# Patient Record
Sex: MALE | Race: OTHER | NOT HISPANIC OR LATINO | URBAN - METROPOLITAN AREA
[De-identification: names, ages, dates, MRNs, and addresses within clinical notes are randomized per-mention and may not be internally consistent; named-entity substitution may affect disease eponyms.]

---

## 2019-03-25 ENCOUNTER — OUTPATIENT (OUTPATIENT)
Dept: OUTPATIENT SERVICES | Facility: HOSPITAL | Age: 54
LOS: 1 days | End: 2019-03-25

## 2021-07-13 ENCOUNTER — EMERGENCY (EMERGENCY)
Facility: HOSPITAL | Age: 56
LOS: 1 days | Discharge: HOME | End: 2021-07-13
Attending: EMERGENCY MEDICINE
Payer: COMMERCIAL

## 2021-07-13 VITALS
SYSTOLIC BLOOD PRESSURE: 102 MMHG | OXYGEN SATURATION: 98 % | HEART RATE: 74 BPM | HEIGHT: 68 IN | TEMPERATURE: 99 F | WEIGHT: 182.98 LBS | DIASTOLIC BLOOD PRESSURE: 62 MMHG | RESPIRATION RATE: 20 BRPM

## 2021-07-13 PROCEDURE — 29105 APPLICATION LONG ARM SPLINT: CPT | Mod: RT

## 2021-07-13 PROCEDURE — 99284 EMERGENCY DEPT VISIT MOD MDM: CPT | Mod: 25

## 2021-07-13 PROCEDURE — 73080 X-RAY EXAM OF ELBOW: CPT

## 2021-07-13 PROCEDURE — 99284 EMERGENCY DEPT VISIT MOD MDM: CPT

## 2021-07-13 PROCEDURE — 73080 X-RAY EXAM OF ELBOW: CPT | Mod: 26,RT

## 2021-07-13 RX ORDER — IBUPROFEN 200 MG
600 TABLET ORAL ONCE
Refills: 0 | Status: COMPLETED | OUTPATIENT
Start: 2021-07-13 | End: 2021-07-13

## 2021-07-13 RX ADMIN — Medication 600 MILLIGRAM(S): at 13:48

## 2021-07-13 NOTE — ED PROVIDER NOTE - ATTENDING CONTRIBUTION TO CARE
Patient with elbow injury.  XR with olecranon fracture.  ortho bedside evaluated and splinted.  will f/u.  Non toxic.  Well appearing. no other injury.  Uneventful ED observation period. Discussed signs and symptoms and reasons for return with good teachback. Discussed results and outcome of testing with the patient.  Patient advised to please follow up with their primary care doctor within the next 24 hours and return to the Emergency Department for worsening symptoms or any other concerns.  Patient advised that their doctor may call  to follow up on the specific results of the tests performed today in the emergency department.

## 2021-07-13 NOTE — ED PROVIDER NOTE - PATIENT PORTAL LINK FT
You can access the FollowMyHealth Patient Portal offered by [facility] by registering at the following website: [email or web address]. By joining [employer]’s FollowMyHealth portal, you will also be able to view your health information using other applications (apps) compatible with our system.

## 2021-07-13 NOTE — ED ADULT TRIAGE NOTE - CHIEF COMPLAINT QUOTE
Pt arrives to ED  s/p hitting his R elbow on the car door. Significant swelling noted to the elbow Ice pack applied

## 2021-07-13 NOTE — ED PROVIDER NOTE - CARE PROVIDER_API CALL
Giacomo Marie)  Orthopaedic Surgery  [address] [place] [address]  Phone: [phone]  Fax: [phone]  Follow Up Time:

## 2021-07-13 NOTE — CONSULT NOTE ADULT - SUBJECTIVE AND OBJECTIVE BOX
Patient is a 55y Male presenting to the emergency department with a chief complaint of right elbow pain after hitting it up against a car door earlier today. Patient is RHD, denies numbness/tingling and denies shoulder and wrist pain. Patient has no prior injuries to right elbow. He is a .    REVIEW OF SYSTEMS    General:	denies fever, chills    Skin/Breast: denies rashes  	  Respiratory and Thorax: denies SOB  	  Cardiovascular:	denies CP    Gastrointestinal:	denies abdominal pain    Genitourinary:	denies incontinence    Musculoskeletal:	 + right elbow pain    Neurological:	deniesparesthesias      PAST MEDICAL & SURGICAL HISTORY:  No pertinent past medical history    Allergies: No Known Allergies    Medications: see med rec    FAMILY HISTORY:  : non-contributory    Social History: lives at home alone  ETOH: +  Smoking: +  denies illicit drug use    Ambulation: independent    PHYSICAL EXAM:    Vital Signs Last 24 Hrs  T(C): 37.4 (13 Jul 2021 13:04), Max: 37.4 (13 Jul 2021 13:04)  T(F): 99.3 (13 Jul 2021 13:04), Max: 99.3 (13 Jul 2021 13:04)  HR: 74 (13 Jul 2021 13:04) (74 - 74)  BP: 102/62 (13 Jul 2021 13:04) (102/62 - 102/62)  RR: 20 (13 Jul 2021 13:04) (20 - 20)  SpO2: 98% (13 Jul 2021 13:04) (98% - 98%)    Appearance: Alert, responsive, in no acute distress.  Right elbow: Skin intact, no open wounds, no ecchymosis, + swelling posterior elbow with mild tenderness to palpation. Able to palpate mobile bone posterior elbow at olecranon. Proximal forearm otherwise grossly  AIN/PIN/intrinsics intact  SILT Rad/med/uln distrib. Rad pulse +2  Compartments soft, NT  Wrist grossly NT    Imaging Studies: < from: Xray Elbow AP + Lateral + Oblique, Right (07.13.21 @ 14:01) >  IMPRESSION:  Olecranon fracture with 2 cm gap    < end of copied text >      A/P:  Pt is a  55y Male with right elbow displaced olecranon fracture    PLAN:   ·	NWB right UE  ·	Well padded Posterior splint applied  ·	Pain control  ·	F/u with Dr. Marie outpatient 1 week. Swelling needs to improve before surgical intervention  ·	D/w Dr. Joyce
No

## 2021-07-13 NOTE — ED PROVIDER NOTE - NSFOLLOWUPINSTRUCTIONS_ED_ALL_ED_FT

## 2021-07-13 NOTE — ED PROVIDER NOTE - PHYSICAL EXAMINATION
right elbow: + large effusion noted, + FROM, no bony TTP, radial pulse 2+ , sensation intact distally , skin intact, no erythema

## 2021-07-13 NOTE — ED PROVIDER NOTE - PROGRESS NOTE DETAILS
PT evaluated by ortho and will d/c with ortho follow up. Pt advised of importance of follow up with ortho as he will require surgery.

## 2021-08-02 PROBLEM — Z00.00 ENCOUNTER FOR PREVENTIVE HEALTH EXAMINATION: Status: ACTIVE | Noted: 2021-08-02

## 2021-08-04 ENCOUNTER — APPOINTMENT (OUTPATIENT)
Dept: ORTHOPEDIC SURGERY | Facility: CLINIC | Age: 56
End: 2021-08-04
Payer: COMMERCIAL

## 2021-08-04 VITALS
BODY MASS INDEX: 27.4 KG/M2 | HEIGHT: 69 IN | SYSTOLIC BLOOD PRESSURE: 116 MMHG | WEIGHT: 185 LBS | DIASTOLIC BLOOD PRESSURE: 79 MMHG | HEART RATE: 89 BPM

## 2021-08-04 DIAGNOSIS — F17.200 NICOTINE DEPENDENCE, UNSPECIFIED, UNCOMPLICATED: ICD-10-CM

## 2021-08-04 DIAGNOSIS — Z56.0 UNEMPLOYMENT, UNSPECIFIED: ICD-10-CM

## 2021-08-04 DIAGNOSIS — Z78.9 OTHER SPECIFIED HEALTH STATUS: ICD-10-CM

## 2021-08-04 PROCEDURE — 99204 OFFICE O/P NEW MOD 45 MIN: CPT

## 2021-08-04 PROCEDURE — 99072 ADDL SUPL MATRL&STAF TM PHE: CPT

## 2021-08-04 SDOH — ECONOMIC STABILITY - INCOME SECURITY: UNEMPLOYMENT, UNSPECIFIED: Z56.0

## 2021-08-05 ENCOUNTER — OUTPATIENT (OUTPATIENT)
Dept: OUTPATIENT SERVICES | Facility: HOSPITAL | Age: 56
LOS: 1 days | End: 2021-08-05
Payer: COMMERCIAL

## 2021-08-05 ENCOUNTER — TRANSCRIPTION ENCOUNTER (OUTPATIENT)
Age: 56
End: 2021-08-05

## 2021-08-05 VITALS
WEIGHT: 181.66 LBS | HEIGHT: 69 IN | DIASTOLIC BLOOD PRESSURE: 82 MMHG | SYSTOLIC BLOOD PRESSURE: 132 MMHG | HEART RATE: 74 BPM | RESPIRATION RATE: 18 BRPM | TEMPERATURE: 98 F

## 2021-08-05 DIAGNOSIS — Z98.890 OTHER SPECIFIED POSTPROCEDURAL STATES: Chronic | ICD-10-CM

## 2021-08-05 DIAGNOSIS — H74.92 UNSPECIFIED DISORDER OF LEFT MIDDLE EAR AND MASTOID: Chronic | ICD-10-CM

## 2021-08-05 DIAGNOSIS — Z01.818 ENCOUNTER FOR OTHER PREPROCEDURAL EXAMINATION: ICD-10-CM

## 2021-08-05 DIAGNOSIS — S52.023A DISPLACED FRACTURE OF OLECRANON PROCESS WITHOUT INTRAARTICULAR EXTENSION OF UNSPECIFIED ULNA, INITIAL ENCOUNTER FOR CLOSED FRACTURE: ICD-10-CM

## 2021-08-05 DIAGNOSIS — Z29.9 ENCOUNTER FOR PROPHYLACTIC MEASURES, UNSPECIFIED: ICD-10-CM

## 2021-08-05 DIAGNOSIS — Z71.89 OTHER SPECIFIED COUNSELING: ICD-10-CM

## 2021-08-05 LAB
A1C WITH ESTIMATED AVERAGE GLUCOSE RESULT: 6.5 % — HIGH (ref 4–5.6)
ALBUMIN SERPL ELPH-MCNC: 4.5 G/DL — SIGNIFICANT CHANGE UP (ref 3.3–5.2)
ALP SERPL-CCNC: 177 U/L — HIGH (ref 40–120)
ALT FLD-CCNC: 21 U/L — SIGNIFICANT CHANGE UP
ANION GAP SERPL CALC-SCNC: 14 MMOL/L — SIGNIFICANT CHANGE UP (ref 5–17)
AST SERPL-CCNC: 18 U/L — SIGNIFICANT CHANGE UP
BASOPHILS # BLD AUTO: 0.02 K/UL — SIGNIFICANT CHANGE UP (ref 0–0.2)
BASOPHILS NFR BLD AUTO: 0.3 % — SIGNIFICANT CHANGE UP (ref 0–2)
BILIRUB SERPL-MCNC: 0.7 MG/DL — SIGNIFICANT CHANGE UP (ref 0.4–2)
BLD GP AB SCN SERPL QL: SIGNIFICANT CHANGE UP
BUN SERPL-MCNC: 18.3 MG/DL — SIGNIFICANT CHANGE UP (ref 8–20)
CALCIUM SERPL-MCNC: 9.6 MG/DL — SIGNIFICANT CHANGE UP (ref 8.6–10.2)
CHLORIDE SERPL-SCNC: 99 MMOL/L — SIGNIFICANT CHANGE UP (ref 98–107)
CO2 SERPL-SCNC: 28 MMOL/L — SIGNIFICANT CHANGE UP (ref 22–29)
CREAT SERPL-MCNC: 0.84 MG/DL — SIGNIFICANT CHANGE UP (ref 0.5–1.3)
EOSINOPHIL # BLD AUTO: 0.22 K/UL — SIGNIFICANT CHANGE UP (ref 0–0.5)
EOSINOPHIL NFR BLD AUTO: 3 % — SIGNIFICANT CHANGE UP (ref 0–6)
ESTIMATED AVERAGE GLUCOSE: 140 MG/DL — HIGH (ref 68–114)
GLUCOSE SERPL-MCNC: 105 MG/DL — HIGH (ref 70–99)
HCT VFR BLD CALC: 46.3 % — SIGNIFICANT CHANGE UP (ref 39–50)
HGB BLD-MCNC: 14.5 G/DL — SIGNIFICANT CHANGE UP (ref 13–17)
IMM GRANULOCYTES NFR BLD AUTO: 0.3 % — SIGNIFICANT CHANGE UP (ref 0–1.5)
LYMPHOCYTES # BLD AUTO: 1.83 K/UL — SIGNIFICANT CHANGE UP (ref 1–3.3)
LYMPHOCYTES # BLD AUTO: 25.1 % — SIGNIFICANT CHANGE UP (ref 13–44)
MCHC RBC-ENTMCNC: 31.1 PG — SIGNIFICANT CHANGE UP (ref 27–34)
MCHC RBC-ENTMCNC: 31.3 GM/DL — LOW (ref 32–36)
MCV RBC AUTO: 99.4 FL — SIGNIFICANT CHANGE UP (ref 80–100)
MONOCYTES # BLD AUTO: 0.65 K/UL — SIGNIFICANT CHANGE UP (ref 0–0.9)
MONOCYTES NFR BLD AUTO: 8.9 % — SIGNIFICANT CHANGE UP (ref 2–14)
NEUTROPHILS # BLD AUTO: 4.54 K/UL — SIGNIFICANT CHANGE UP (ref 1.8–7.4)
NEUTROPHILS NFR BLD AUTO: 62.4 % — SIGNIFICANT CHANGE UP (ref 43–77)
PLATELET # BLD AUTO: 234 K/UL — SIGNIFICANT CHANGE UP (ref 150–400)
POTASSIUM SERPL-MCNC: 4.2 MMOL/L — SIGNIFICANT CHANGE UP (ref 3.5–5.3)
POTASSIUM SERPL-SCNC: 4.2 MMOL/L — SIGNIFICANT CHANGE UP (ref 3.5–5.3)
PROT SERPL-MCNC: 8.1 G/DL — SIGNIFICANT CHANGE UP (ref 6.6–8.7)
RBC # BLD: 4.66 M/UL — SIGNIFICANT CHANGE UP (ref 4.2–5.8)
RBC # FLD: 12.3 % — SIGNIFICANT CHANGE UP (ref 10.3–14.5)
SARS-COV-2 RNA SPEC QL NAA+PROBE: SIGNIFICANT CHANGE UP
SODIUM SERPL-SCNC: 141 MMOL/L — SIGNIFICANT CHANGE UP (ref 135–145)
WBC # BLD: 7.28 K/UL — SIGNIFICANT CHANGE UP (ref 3.8–10.5)
WBC # FLD AUTO: 7.28 K/UL — SIGNIFICANT CHANGE UP (ref 3.8–10.5)

## 2021-08-05 PROCEDURE — 71046 X-RAY EXAM CHEST 2 VIEWS: CPT | Mod: 26

## 2021-08-05 PROCEDURE — 93010 ELECTROCARDIOGRAM REPORT: CPT
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
## 2021-08-05 NOTE — H&P PST ADULT - ADMIT DATE
He is not really a candidate for pneumonia vaccine but if he wants 1 that is fine with me   05-Aug-2021

## 2021-08-05 NOTE — H&P PST ADULT - EKG AND INTERPRETATION
EKG reviewed personally Rate =       Bpm   finding    official reading EKG reviewed personally Rate =  64     Bpm   finding  NSR pending   official reading

## 2021-08-05 NOTE — H&P PST ADULT - NSICDXPROBLEM_GEN_ALL_CORE_FT
PROBLEM DIAGNOSES  Problem: Displaced fracture of olecranon process without intraarticular extension of unspecified ulna, initial encounter for closed fracture  Assessment and Plan: pt ia havinga open reduction internal fixation of the right olecranon fracture with Dr Vázquez on 8/6/21    Problem: Need for prophylactic measure  Assessment and Plan: caprini score is 6 at VTE risk SCD's ordered surgical connor to assess the need for pharm  proph     Problem: Educated about COVID-19 virus infection  Assessment and Plan: pt educated on covid testing as well asprevention of covid . Patient has vaccine card in chart
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
## 2021-08-05 NOTE — ASU PATIENT PROFILE, ADULT - TEACHING/LEARNING CULTURAL CONSIDERATIONS
"The patient location is: home  The chief complaint leading to consultation is: Patient requiring therapy for language impairment  Visit type: Virtual visit with synchronous audio and video  Total time spent with patient: 45 minutes  Each patient to whom he or she provides medical services by telemedicine is:  (1) informed of the relationship between the physician and patient and the respective role of any other health care provider with respect to management of the patient; and (2) notified that he or she may decline to receive medical services by telemedicine and may withdraw from such care at any time.    Notes:       Outpatient Pediatric Speech Therapy Daily Note    Date: 4/15/2020    Patient Name: Mp Euceda  MRN: [id number]  Therapy Diagnosis:   Encounter Diagnosis   Name Primary?    Autism spectrum disorder, requiring support, with accompanying language impairment Yes      Physician: Marlys Mon MD   Physician Orders: eval and treat   Medical Diagnosis: ASD  Age: 8  y.o. 3  m.o.    Visit # / Visits Authorized:4/15    Date of Evaluation:   Plan of Care Expiration Date: 12/31/20  Authorization Date: 2/5/20    Time In: 4:00 PM  Time Out: 5:00 PM  Total Billable Time: 60 min     Precautions: standard    Subjective:   Mp Euceda was seen today for speech therapy to address the above. The patient was seen via virtual visit in order to permit the patient to "shelter in place" and to reduce the risk of exposure to COVID-19.    He was compliant to home exercise program.   Response to previous treatment: good   Both parents brought Mp to therapy today.  Pain: Mp was unable to rate pain on a numeric scale, but no pain behaviors were noted in today's session.  Objective:   UNTIMED  Procedure Min.   Speech- Language- Voice Therapy    45   Total Untimed Units: 1  Charges Billed/# of units: 1  Short-term objectives:  Mp will:    1)RJ will complete moderately complex reading tasks " and answer accompanying questions with 90% accuracy (goal met)  2) RJ will answer multi-part questions in entirety with 80% accuracy for punctuation and grammar.  2) RJ will find and highlight evidence in a passage to support his answer with 90% accuracy.  3) RJ will make inferences from reading passages with 90% accuracy when provided with mod cues.  4) Identify and utilize the accurate preposition in, at, or on, during written activities and conversational speech with 80% accuracy when provided with mod cues, across 3 consecutive sessions: Previous dataRJ completed written preposition tasks with 80% accuracy today. (progress maintained)    Goals met:  1. Answer wh- questions with 90% accuracy when provided with min cues across 3 consecutive sessions.Mp answered wh- questions today with 90% accuracy. (Goal met)  2. Demonstrate accurate use of past, present and future tense during conversational speech with 90% accuracy when provided with min cues across 3 consecutive sessions. (Goal met)  3. Demonstrate turn-taking with others during conversational speech with 90% accuracy when provided with in cues across 3 consecutive sessions.  (Goal met)    STO 1:  2/12/20: RJ read the passage and answered questions with 90% accuracy (goal met on this date; add goal to target skill at higher level for next session)  1/29/20- RJ read the passage and answered quesitons for comprehension with 90% accuracy.  1/22/20- RJ read the passage (x) and answered questions for comprehension with 90% accuracy    STO 2:  2/26/20- RJ answered both parts of a 2-part question on 0/4 opportunities today. He was provided with mod cues for attention to task.  1/29/30- RJ answered both parts of a 2- part question on 4/9 opportunities today. He increased accuracy when cued for awareness.  1/22/20- RJ answered both parts to a 2-part question on 3/7 opportunities today. When provided with mod cues and reminders he increased to 5/7    STO  none "3:  4/15/20- RJ completed inferencing activity, using 4 written clues to draw inferences with 90% accuracy.   4/8/20- Today, RJ complete inferencing activity with 90% accuracy.    4/1/20- RJ completed inferencing activity, using 4 written clues to draw inferences with 70% accuracy. He was able to differentiate between "known information" and "inferred information" with 80% accuracy and showed increased ability to make an inference when reading about familiar situations.  3/25/20-Today RJ completed inferencing activity, using 4 written clues to draw inferences with 70% accuracy. He was able to differentiate between "known information" and "inferred information" with 80% accuracy and showed increased ability to make an inference when reading about familiar situations.  3/18/20- Today RJ completed inferencing activity, using 4 written clues to draw inferences with 65% accuracy. He was able to differentiate between "known information" and "inferred information" with 80% accuracy and showed increased ability to make an inference when reading about familiar situations.  3/4/20 RJ made inferences about how what would happen if the passage continued with 65% accuracy today  2/26/20- RJ made inferences about how what would happen if the passage continued with 60% accuracy today. He then inferred why characters reacted the way they did in a story with 0% accuracy. Rather, he listed their direct actions but was unable to piece together why they reacted the way that they did.  1/22/20- RJ made inferences about how characters were feeling based on discriptions in the passage with 85% accuracy. He exhibited difficulty making inferences of what could happen next in a story sequence based on what he read.    Long-term goals:  Mp will exhibit:  1. Age appropriate receptive language skills.  2. Age appropriate expressive language skills.  3. Age appropriate pragmatic language skills.  Patient Education/Response:     Written Home " Exercises Provided: yes.  Strategies / Exercises were reviewed and Mp was able to demonstrate them prior to the end of the session.  Mp demonstrated good  understanding of the education provided.     See EMR under Patient Instructions for exercises provided prior visit  Assessment:   Mp is progressing toward his goals.   Current goals remain appropriate.  Goals will be added and re-assessed as needed.      Pt prognosis is Excellent. Pt will continue to benefit from skilled outpatient speech and language therapy to address the deficits listed in the problem list on initial evaluation, provide pt/famil0 y education and to maximize pt's level of independence in the home and community environment.     Medical necessity is demonstrated by the following IMPAIRMENTS:  ABBI continues to exhibit decreased receptive and expressive language impacting his ability to communicate information pertaining to his health and safety with parents, teachers, care-givers and medical professionals.    Barriers to Therapy: none identified  Pt's spiritual, cultural and educational needs considered and pt agreeable to plan of care and goals.  Plan:   Continue Plan of Care    Manda Bolanos CCC-SLP   4/15/2020         n the home program at the conclusion of the session and verbalized agreement with treatment plan.

## 2021-08-06 ENCOUNTER — APPOINTMENT (OUTPATIENT)
Dept: ORTHOPEDIC SURGERY | Facility: HOSPITAL | Age: 56
End: 2021-08-06

## 2021-08-06 ENCOUNTER — OUTPATIENT (OUTPATIENT)
Dept: OUTPATIENT SERVICES | Facility: HOSPITAL | Age: 56
LOS: 1 days | End: 2021-08-06
Payer: COMMERCIAL

## 2021-08-06 ENCOUNTER — RESULT REVIEW (OUTPATIENT)
Age: 56
End: 2021-08-06

## 2021-08-06 VITALS
RESPIRATION RATE: 18 BRPM | HEART RATE: 94 BPM | OXYGEN SATURATION: 97 % | SYSTOLIC BLOOD PRESSURE: 156 MMHG | TEMPERATURE: 98 F | DIASTOLIC BLOOD PRESSURE: 94 MMHG

## 2021-08-06 VITALS
TEMPERATURE: 98 F | RESPIRATION RATE: 15 BRPM | WEIGHT: 186.07 LBS | OXYGEN SATURATION: 98 % | DIASTOLIC BLOOD PRESSURE: 78 MMHG | HEART RATE: 78 BPM | HEIGHT: 69 IN | SYSTOLIC BLOOD PRESSURE: 122 MMHG

## 2021-08-06 DIAGNOSIS — S52.023A DISPLACED FRACTURE OF OLECRANON PROCESS WITHOUT INTRAARTICULAR EXTENSION OF UNSPECIFIED ULNA, INITIAL ENCOUNTER FOR CLOSED FRACTURE: ICD-10-CM

## 2021-08-06 DIAGNOSIS — H74.92 UNSPECIFIED DISORDER OF LEFT MIDDLE EAR AND MASTOID: Chronic | ICD-10-CM

## 2021-08-06 DIAGNOSIS — Z98.890 OTHER SPECIFIED POSTPROCEDURAL STATES: Chronic | ICD-10-CM

## 2021-08-06 LAB
ABO RH CONFIRMATION: SIGNIFICANT CHANGE UP
GLUCOSE BLDC GLUCOMTR-MCNC: 141 MG/DL — HIGH (ref 70–99)

## 2021-08-06 PROCEDURE — 24685 OPTX ULNAR FX PROX END W/FIX: CPT | Mod: RT

## 2021-08-06 PROCEDURE — 24586 OPTX PARTCLR FX&/DISLC ELBOW: CPT | Mod: RT

## 2021-08-06 PROCEDURE — G0463: CPT

## 2021-08-06 PROCEDURE — 24685 OPTX ULNAR FX PROX END W/FIX: CPT | Mod: AS,RT

## 2021-08-06 PROCEDURE — C1713: CPT

## 2021-08-06 PROCEDURE — 36415 COLL VENOUS BLD VENIPUNCTURE: CPT

## 2021-08-06 PROCEDURE — 82962 GLUCOSE BLOOD TEST: CPT

## 2021-08-06 PROCEDURE — 71046 X-RAY EXAM CHEST 2 VIEWS: CPT

## 2021-08-06 PROCEDURE — 93005 ELECTROCARDIOGRAM TRACING: CPT

## 2021-08-06 PROCEDURE — 76000 FLUOROSCOPY <1 HR PHYS/QHP: CPT

## 2021-08-06 RX ORDER — OXYCODONE AND ACETAMINOPHEN 5; 325 MG/1; MG/1
1 TABLET ORAL EVERY 4 HOURS
Refills: 0 | Status: DISCONTINUED | OUTPATIENT
Start: 2021-08-06 | End: 2021-08-06

## 2021-08-06 RX ORDER — SODIUM CHLORIDE 9 MG/ML
3 INJECTION INTRAMUSCULAR; INTRAVENOUS; SUBCUTANEOUS ONCE
Refills: 0 | Status: DISCONTINUED | OUTPATIENT
Start: 2021-08-06 | End: 2021-08-06

## 2021-08-06 RX ORDER — HYDROMORPHONE HYDROCHLORIDE 2 MG/ML
0.5 INJECTION INTRAMUSCULAR; INTRAVENOUS; SUBCUTANEOUS
Refills: 0 | Status: DISCONTINUED | OUTPATIENT
Start: 2021-08-06 | End: 2021-08-06

## 2021-08-06 RX ORDER — OXYCODONE AND ACETAMINOPHEN 5; 325 MG/1; MG/1
2 TABLET ORAL EVERY 4 HOURS
Refills: 0 | Status: DISCONTINUED | OUTPATIENT
Start: 2021-08-06 | End: 2021-08-06

## 2021-08-06 RX ORDER — CEFAZOLIN SODIUM 1 G
2000 VIAL (EA) INJECTION ONCE
Refills: 0 | Status: DISCONTINUED | OUTPATIENT
Start: 2021-08-06 | End: 2021-08-06

## 2021-08-06 RX ORDER — SODIUM CHLORIDE 9 MG/ML
1000 INJECTION, SOLUTION INTRAVENOUS
Refills: 0 | Status: DISCONTINUED | OUTPATIENT
Start: 2021-08-06 | End: 2021-08-06

## 2021-08-06 RX ORDER — ONDANSETRON 8 MG/1
4 TABLET, FILM COATED ORAL ONCE
Refills: 0 | Status: DISCONTINUED | OUTPATIENT
Start: 2021-08-06 | End: 2021-08-06

## 2021-08-06 RX ADMIN — HYDROMORPHONE HYDROCHLORIDE 0.5 MILLIGRAM(S): 2 INJECTION INTRAMUSCULAR; INTRAVENOUS; SUBCUTANEOUS at 16:32

## 2021-08-06 RX ADMIN — HYDROMORPHONE HYDROCHLORIDE 0.5 MILLIGRAM(S): 2 INJECTION INTRAMUSCULAR; INTRAVENOUS; SUBCUTANEOUS at 16:04

## 2021-08-06 RX ADMIN — HYDROMORPHONE HYDROCHLORIDE 0.5 MILLIGRAM(S): 2 INJECTION INTRAMUSCULAR; INTRAVENOUS; SUBCUTANEOUS at 16:34

## 2021-08-06 NOTE — ASU DISCHARGE PLAN (ADULT/PEDIATRIC) - ASU DC SPECIAL INSTRUCTIONSFT
Follow-up in office with Dr. Vázquez in 1 week. Call office to confirm appointment  - KEEP SPLINT DRY. Can use cast bag or plastic bag to keep dry  - NON-weight bearing of the right arm  - elevate when laying down

## 2021-08-06 NOTE — ASU DISCHARGE PLAN (ADULT/PEDIATRIC) - CALL YOUR DOCTOR IF YOU HAVE ANY OF THE FOLLOWING:
Pain not relieved by Medications/Fever greater than (need to indicate Fahrenheit or Celsius)/Wound/Surgical Site with redness, or foul smelling discharge or pus/Numbness, tingling, color or temperature change to extremity Pain not relieved by Medications/Fever greater than (need to indicate Fahrenheit or Celsius)/Wound/Surgical Site with redness, or foul smelling discharge or pus/Numbness, tingling, color or temperature change to extremity/Nausea and vomiting that does not stop

## 2021-08-06 NOTE — ASU DISCHARGE PLAN (ADULT/PEDIATRIC) - CARE PROVIDER_API CALL
Donato Vázquez (DO)  Orthopedics  [address] [place] [id number]  Phone: [phone]  Fax: [phone]  Follow Up Time:

## 2021-08-18 PROBLEM — F17.200 NICOTINE DEPENDENCE, UNSPECIFIED, UNCOMPLICATED: Chronic | Status: ACTIVE | Noted: 2021-08-05

## 2021-08-19 ENCOUNTER — APPOINTMENT (OUTPATIENT)
Dept: ORTHOPEDIC SURGERY | Facility: CLINIC | Age: 56
End: 2021-08-19
Payer: COMMERCIAL

## 2021-08-19 PROCEDURE — 99024 POSTOP FOLLOW-UP VISIT: CPT

## 2021-08-19 PROCEDURE — 73080 X-RAY EXAM OF ELBOW: CPT | Mod: RT

## 2021-08-19 NOTE — HISTORY OF PRESENT ILLNESS
[de-identified] : Status post open reduction and internal fixation of a right olecranon fracture/proximal ulna fracture on 8/6/2021 [de-identified] : Glenys is a pleasant 55-year-old male who returns to the office today for follow-up status post open reduction and internal fixation of a right olecranon/proximal ulna fracture.  Overall the patient states he is feeling well and has had improvements in his pain and swelling since the day of surgery.  He has remained nonweightbearing of his right upper extremity in his splint and sling.  He has been taking Tylenol and oxycodone as needed for pain.  He has been icing the elbow.  The patient denies any fevers, chills, sweats, recent illnesses, numbness, tingling, weakness, or pain elsewhere at this time. [de-identified] : On exam, the patient is pleasant.  They  are awake, alert, and oriented x3.  The patient walks into my office without an antalgic gait.\par Weight is appropriate for height\par Full range of motion of cervical spine without instability\par Full range of motion of back without instability\par Intact neurologic, vascular, and dermatologic exam to right and left upper extremities\par Intact neurologic, vascular, and dermatologic exam to right and left lower extremities\par \par Right upper Extremity\par The patient's splint was removed today in the office.  His incision is well approximated with intact sutures.  The incision was cleaned with alcohol and the sutures removed in their entirety.  Steri-Strips were applied.  The patient tolerated this well.  There is no erythema, warmth, or joint effusion.  There is mild postoperative swelling.  There is mild tenderness palpation about the operative site.  Range of motion: 30 to 100 degrees without crepitus or pain.  Strength not tested.  AIN/PIN/radial/ulnar/median motor function intact, sensations intact light touch in C5-T1 distributions, radial pulses 2+, compartments are soft and compressible. [de-identified] : X-rays including 3 views of the right elbow were obtained in the office and reviewed with the patient today.  Patient is status post open reduction and internal fixation of right olecranon fracture.  The hardware is intact without any evidence of loosening.  The fracture remains in good alignment. [de-identified] : 55-year-old male status post open reduction and internal fixation of right olecranon fracture on 8/6/2021 [de-identified] : Overall Glenys is recovering well from his right elbow surgery.  He reports improvement in his pain and swelling since the day of surgery.  His splint was removed today in the office and he was converted into a hinged elbow brace.  The brace was fully unlocked to begin gentle range of motion exercises on his own.  A referral for physical therapy was provided today.  He will remain nonweightbearing of his right upper extremity.  He is advised not to lean on the elbow till his incision is completely healed.  He may continue to take Tylenol or oxycodone as needed for pain.  He should continue to ice the elbow for swelling.  Recommend follow-up in 2 weeks for repeat clinical and radiographic evaluation.  The patient verbalizes understanding and agrees with the plan.  All questions were answered to his satisfaction.

## 2021-09-02 ENCOUNTER — APPOINTMENT (OUTPATIENT)
Dept: ORTHOPEDIC SURGERY | Facility: CLINIC | Age: 56
End: 2021-09-02
Payer: COMMERCIAL

## 2021-09-02 PROCEDURE — 99024 POSTOP FOLLOW-UP VISIT: CPT

## 2021-09-02 NOTE — HISTORY OF PRESENT ILLNESS
[de-identified] : Status post open reduction and internal fixation of a right olecranon fracture/proximal ulna fracture on 8/6/2021 [de-identified] : Glenys is a pleasant 55-year-old male who returns to the office today for follow-up status post open reduction and internal fixation of a right olecranon/proximal ulna fracture.  Overall the patient states he is feeling well and has had improvements in his pain and swelling since the day of surgery.  He has been noncompliant with his brace wear and has removed it.  He is not wearing it today.  Is also been noncompliant with physical therapy and has not gone for any visits.  He does report residual stiffness in the elbow.  He is not taking anything for pain at this point.  He reports no fevers, chills, sweats, redness, warmth, drainage, numbness, tingling, or pain elsewhere. [de-identified] : On exam, the patient is pleasant.  They  are awake, alert, and oriented x3.  The patient walks into my office without an antalgic gait.\par Weight is appropriate for height\par Full range of motion of cervical spine without instability\par Full range of motion of back without instability\par Intact neurologic, vascular, and dermatologic exam to right and left upper extremities\par Intact neurologic, vascular, and dermatologic exam to right and left lower extremities\par \par Right upper Extremity\par The patient's splint was removed today in the office.  His incision is well healed.  There is no erythema, warmth, or joint effusion.  There is mild postoperative swelling.  There is mild tenderness palpation about the operative site.  Range of motion:  degrees.  Strength not tested.  AIN/PIN/radial/ulnar/median motor function intact, sensations intact light touch in C5-T1 distributions, radial pulses 2+, compartments are soft and compressible. [de-identified] : 55-year-old male status post open reduction and internal fixation of right olecranon fracture on 8/6/2021 [de-identified] : Glenys has been noncompliant with my postoperative protocol.  He has not been wearing his brace as he was instructed to do at his last visit.  He also has not participated in physical therapy.  Despite his significant improvements in pain and swelling he does have residual stiffness which will not improve without therapy.  I stressed the importance of compliance with physical therapy or he could have a permanently stiff elbow joint..  He is instructed that he must remain nonweightbearing of his operative extremity in his brace at all times which is fully unlocked for range of motion.  He may remove the brace periodically to do range of motion exercises at home.  I instructed him to  the physical therapy referral from the office today and call to make an appointment immediately so they may begin working on range of motion exercises to mitigate the risk of postoperative stiffness.  He understands the failure to comply with therapy could lead to a suboptimal outcome.  Recommend follow-up in 2 weeks for repeat clinical and radiographic evaluation as well as a range of motion check.  He verbalizes understanding and agrees with the plan.  All questions were answered to his satisfaction.

## 2021-09-23 ENCOUNTER — APPOINTMENT (OUTPATIENT)
Dept: ORTHOPEDIC SURGERY | Facility: CLINIC | Age: 56
End: 2021-09-23
Payer: COMMERCIAL

## 2021-09-23 PROCEDURE — 99024 POSTOP FOLLOW-UP VISIT: CPT

## 2021-09-23 PROCEDURE — 73080 X-RAY EXAM OF ELBOW: CPT | Mod: RT

## 2021-09-23 NOTE — HISTORY OF PRESENT ILLNESS
[de-identified] : Glenys is a pleasant 55-year-old male who returns to the office today for follow-up status post open reduction and internal fixation of a right olecranon/proximal ulna fracture.  Overall the patient states he is feeling well and has had improvements in his pain and swelling since the day of surgery.  He has been noncompliant with use of a brace but has started physical therapy.  He states it is going well and is improved his range of motion.  He is happy with his progress so far.  He denies any fevers, chills, sweats, redness, warmth, drainage, numbness, tingling, or pain elsewhere. [de-identified] : Status post open reduction and internal fixation of a right olecranon fracture/proximal ulna fracture on 8/6/2021 [de-identified] : On exam, the patient is pleasant.  They  are awake, alert, and oriented x3.  The patient walks into my office without an antalgic gait.\par Weight is appropriate for height\par Full range of motion of cervical spine without instability\par Full range of motion of back without instability\par Intact neurologic, vascular, and dermatologic exam to right and left upper extremities\par Intact neurologic, vascular, and dermatologic exam to right and left lower extremities\par \par Right upper Extremity\par Incision is well-healed.  There is no erythema, warmth, or joint effusion.  There is minimal postoperative swelling.  There is minimal tenderness palpation about the operative site.  Range of motion: 5 to 130 degrees.  Strength not tested.  AIN/PIN/radial/ulnar/median motor function intact, sensations intact light touch in C5-T1 distributions, radial pulses 2+, compartments are soft and compressible. [de-identified] : X-rays including 3 views of the right elbow obtained in the office and reviewed the patient today.  The patient is status post open reduction internal fixation of right olecranon fracture with intact hardware and evidence of bony healing at the fracture site. [de-identified] : 55-year-old male status post open reduction and internal fixation of right olecranon fracture on 8/6/2021 [de-identified] : Glenys has remained noncompliant with brace wear but overall is feeling well.  He reports improvements in his pain, swelling, and range of motion.  His x-rays demonstrate early healing at the fracture site.  At this point he does not need the brace anymore.  He should continue with physical therapy in order to optimize his range of motion and his strength.  He may return to light activities as tolerated but should avoid any heavy lifting with the arm.  Recommend follow-up in 6 weeks for repeat clinical radiographic evaluation.

## 2021-11-04 ENCOUNTER — APPOINTMENT (OUTPATIENT)
Dept: ORTHOPEDIC SURGERY | Facility: CLINIC | Age: 56
End: 2021-11-04
Payer: COMMERCIAL

## 2021-11-04 DIAGNOSIS — S52.023A DISPLACED FRACTURE OF OLECRANON PROCESS W/OUT INTRAARTICULAR EXTENSION OF UNSPECIFIED ULNA, INITIAL ENCOUNTER FOR CLOSED FRACTURE: ICD-10-CM

## 2021-11-04 PROCEDURE — 73080 X-RAY EXAM OF ELBOW: CPT | Mod: RT

## 2021-11-04 PROCEDURE — 99024 POSTOP FOLLOW-UP VISIT: CPT

## 2021-11-04 NOTE — HISTORY OF PRESENT ILLNESS
[de-identified] : Status post open reduction and internal fixation of a right olecranon fracture/proximal ulna fracture on 8/6/2021 [de-identified] : Glenys is a pleasant 55-year-old male who returns to the office today for follow-up status post open reduction and internal fixation of a right olecranon/proximal ulna fracture.  Patient states he feels well and has no pain..  He has been going to physical therapy.  He states it is going well he is almost regained full motion.  He has been happy with his progress.  He is very happy with his outcome.  He denies any fevers, chills, sweats, redness, warmth, drainage, numbness, tingling, or pain elsewhere. [de-identified] : On exam, the patient is pleasant.  They  are awake, alert, and oriented x3.  The patient walks into my office without an antalgic gait.\par Weight is appropriate for height\par Full range of motion of cervical spine without instability\par Full range of motion of back without instability\par Intact neurologic, vascular, and dermatologic exam to right and left upper extremities\par Intact neurologic, vascular, and dermatologic exam to right and left lower extremities\par \par Right upper Extremity\par Incision is well-healed.  There is no erythema, warmth, or joint effusion.  There is minimal postoperative swelling.  There is minimal tenderness palpation about the operative site.  Range of motion: 3 to 145 degrees compared to 0 to 145 degrees on his left side.  Strength 5/5.  AIN/PIN/radial/ulnar/median motor function intact, sensations intact light touch in C5-T1 distributions, radial pulses 2+, compartments are soft and compressible. [de-identified] : X-rays including 3 views of the right elbow obtained in the office and reviewed the patient today.  The patient is status post open reduction internal fixation of right olecranon fracture with intact hardware and interval healing at the fracture site. [de-identified] : 55-year-old male status post open reduction and internal fixation of right olecranon fracture on 8/6/2021 [de-identified] : Glenys has recovered well from his elbow surgery.  He reports improvements in his pain, swelling, and range of motion.  His x-rays demonstrate continued healing at the fracture site.  At this point he may return to unrestricted activities.  He will finish his course of physical therapy.  He may follow-up with me in 3 months for repeat clinical evaluation.  The patient verbalizes understanding and agrees with plan.  All questions were answered to his satisfaction.